# Patient Record
Sex: MALE | Race: WHITE
[De-identification: names, ages, dates, MRNs, and addresses within clinical notes are randomized per-mention and may not be internally consistent; named-entity substitution may affect disease eponyms.]

---

## 2019-01-03 ENCOUNTER — HOSPITAL ENCOUNTER (OUTPATIENT)
Dept: HOSPITAL 14 - H.ER | Age: 49
Setting detail: OBSERVATION
LOS: 1 days | Discharge: LEFT BEFORE DISCHARGE | End: 2019-01-04
Attending: INTERNAL MEDICINE | Admitting: INTERNAL MEDICINE
Payer: SELF-PAY

## 2019-01-03 DIAGNOSIS — Z88.6: ICD-10-CM

## 2019-01-03 DIAGNOSIS — Z86.711: ICD-10-CM

## 2019-01-03 DIAGNOSIS — Z79.01: ICD-10-CM

## 2019-01-03 DIAGNOSIS — I82.431: Primary | ICD-10-CM

## 2019-01-03 DIAGNOSIS — Z86.718: ICD-10-CM

## 2019-01-03 LAB
ALBUMIN SERPL-MCNC: 4.5 G/DL (ref 3.5–5)
ALBUMIN/GLOB SERPL: 1.1 {RATIO} (ref 1–2.1)
ALT SERPL-CCNC: 49 U/L (ref 21–72)
APTT BLD: 26.1 SECONDS (ref 25.6–37.1)
AST SERPL-CCNC: 36 U/L (ref 17–59)
BASE EXCESS BLDV CALC-SCNC: -2 MMOL/L (ref 0–2)
BASOPHILS # BLD AUTO: 0.1 K/UL (ref 0–0.2)
BASOPHILS NFR BLD: 0.9 % (ref 0–2)
BUN SERPL-MCNC: 15 MG/DL (ref 9–20)
CALCIUM SERPL-MCNC: 9.5 MG/DL (ref 8.4–10.2)
EOSINOPHIL # BLD AUTO: 0.1 K/UL (ref 0–0.7)
EOSINOPHIL NFR BLD: 2.4 % (ref 0–4)
ERYTHROCYTE [DISTWIDTH] IN BLOOD BY AUTOMATED COUNT: 19.1 % (ref 11.5–14.5)
GFR NON-AFRICAN AMERICAN: > 60
HGB BLD-MCNC: 12.5 G/DL (ref 12–18)
INR PPP: 1
LYMPHOCYTES # BLD AUTO: 2.1 K/UL (ref 1–4.3)
LYMPHOCYTES NFR BLD AUTO: 36.7 % (ref 20–40)
MCH RBC QN AUTO: 27.8 PG (ref 27–31)
MCHC RBC AUTO-ENTMCNC: 32.7 G/DL (ref 33–37)
MCV RBC AUTO: 85.1 FL (ref 80–94)
MONOCYTES # BLD: 0.5 K/UL (ref 0–0.8)
MONOCYTES NFR BLD: 8.4 % (ref 0–10)
NEUTROPHILS # BLD: 2.9 K/UL (ref 1.8–7)
NEUTROPHILS NFR BLD AUTO: 51.6 % (ref 50–75)
NRBC BLD AUTO-RTO: 0 % (ref 0–0)
PCO2 BLDV: 40 MMHG (ref 40–60)
PH BLDV: 7.37 [PH] (ref 7.32–7.43)
PLATELET # BLD: 233 K/UL (ref 130–400)
PMV BLD AUTO: 9 FL (ref 7.2–11.7)
PROTHROMBIN TIME: 11.1 SECONDS (ref 9.8–13.1)
RBC # BLD AUTO: 4.49 MIL/UL (ref 4.4–5.9)
VENOUS BLOOD FIO2: 21 %
VENOUS BLOOD GAS PO2: 49 MM/HG (ref 30–55)
WBC # BLD AUTO: 5.7 K/UL (ref 4.8–10.8)

## 2019-01-03 PROCEDURE — 85610 PROTHROMBIN TIME: CPT

## 2019-01-03 PROCEDURE — 99284 EMERGENCY DEPT VISIT MOD MDM: CPT

## 2019-01-03 PROCEDURE — 93005 ELECTROCARDIOGRAM TRACING: CPT

## 2019-01-03 PROCEDURE — 96374 THER/PROPH/DIAG INJ IV PUSH: CPT

## 2019-01-03 PROCEDURE — 85025 COMPLETE CBC W/AUTO DIFF WBC: CPT

## 2019-01-03 PROCEDURE — 82803 BLOOD GASES ANY COMBINATION: CPT

## 2019-01-03 PROCEDURE — 85730 THROMBOPLASTIN TIME PARTIAL: CPT

## 2019-01-03 PROCEDURE — 71045 X-RAY EXAM CHEST 1 VIEW: CPT

## 2019-01-03 PROCEDURE — 93971 EXTREMITY STUDY: CPT

## 2019-01-03 PROCEDURE — 80053 COMPREHEN METABOLIC PANEL: CPT

## 2019-01-03 PROCEDURE — 96372 THER/PROPH/DIAG INJ SC/IM: CPT

## 2019-01-03 NOTE — ED PDOC
Lower Extremity Pain/Injury


Time Seen by Provider: 01/03/19 21:25


Chief Complaint (Nursing): Lower Extremity Problem/Injury


Chief Complaint (Provider): right leg pain/swelling


History Per: Patient


History/Exam Limitations: no limitations


Onset/Duration Of Symptoms: Days (2)


Current Symptoms Are (Timing): Still Present


Additional Complaint(s): 





49 y/o male history of DVT/PE 2 years ago (stopped coumadin 1.5 years ago) 

presents for evaluation of right leg pain/swelling x 2 days.  Denies fever, 

chest pain, shortness of breath, palpitations, numbness/weakness lower ext

remities, known trauma, recent travel.  





Past Medical History


Reviewed: Historical Data, Nursing Documentation, Vital Signs


Vital Signs: 





                                Last Vital Signs











Temp  98.2 F   01/03/19 20:22


 


Pulse  89   01/03/19 20:22


 


Resp  16   01/03/19 20:22


 


BP  136/80   01/03/19 20:22


 


Pulse Ox  97   01/03/19 20:22














- Medical History


PMH: Deep Vein Thrombosis (left leg), Pulmonary Embolism





- Surgical History


Surgical History: No Surg Hx





- Family History


Family History: States: No Known Family Hx





- Social History


Current smoker - smoking cessation education provided: No





- Allergies


Allergies/Adverse Reactions: 


                                    Allergies











Allergy/AdvReac Type Severity Reaction Status Date / Time


 


No Known Allergies Allergy   Verified 01/03/19 20:22














Review of Systems


ROS Statement: Except As Marked, All Systems Reviewed And Found Negative


Musculoskeletal: Positive for: Leg Pain (right)





Physical Exam





- Reviewed


Nursing Documentation Reviewed: Yes


Vital Signs Reviewed: Yes





- Physical Exam


Appears: Positive for: Well, Non-toxic, Uncomfortable


Head Exam: Positive for: ATRAUMATIC, NORMAL INSPECTION, NORMOCEPHALIC


Skin: Positive for: Normal Color


ENT: Positive for: Normal ENT Inspection


Cardiovascular/Chest: Positive for: Regular Rate, Rhythm


Respiratory: Positive for: Normal Breath Sounds


Pulses-Dorsalis Pedis (L): 2+


Pulses-Dorsalis Pedis (R): 2+


Pulses-Post. Tibialis (L): 2+


Pulses-Post. Tibialis (R): 2+


Extremity: Positive for: Normal ROM, Calf Tenderness (right. + Sanam's sign ), 

Swelling (RLE; + abrasion anterior right shin with mild surroinding erythema)


Neurologic/Psych: Positive for: Alert, Oriented (x3).  Negative for: 

Motor/Sensory Deficits





- Laboratory Results


Result Diagrams: 


                                 01/03/19 22:27





                                 01/03/19 22:27





- ECG


ECG: Positive for: Viewed By Me (reviewed by ED attending)


ECG Rhythm: Positive for: Sinus Rhythm


O2 Sat by Pulse Oximetry: 97





- Radiology


X-Ray: Viewed By Me


X-Ray Interpretation: No Acute Disease





- Progress


ED Course And Treament: 





-cbc


-cmp


-pt/ptt


-vbg w/ lactate


-venous duplex RLE


-IV morphine





On re-eval, patient reports no improvement of pain; dilaudid dose ordered





Right lower extremity venous duplex. 


Indication: Right leg pain. 


Findings: 


Real-time ultrasound images with Doppler evaluation. 


Partially occlusive thrombus is noted in the right popliteal vein. Partial 

compressibility of the right popliteal vein. Unremarkable remaining deep veins 

of the right lower extremity. 


Impression: 


Partial deep venous thrombosis





Lovenox dose ordered


ekg, cxr ordered


Case discussed with Dr. Montana, Hospitalist on-call, for admission














Disposition





- Clinical Impression


Clinical Impression: 


 DVT (deep venous thrombosis)








- Patient ED Disposition


Is Patient to be Admitted: Yes





- Disposition


Disposition Time: 01:14


Condition: FAIR





- POA


Present On Arrival: Deep Vein Thrombosis / PE

## 2019-01-04 VITALS
RESPIRATION RATE: 20 BRPM | HEART RATE: 87 BPM | DIASTOLIC BLOOD PRESSURE: 84 MMHG | SYSTOLIC BLOOD PRESSURE: 137 MMHG | OXYGEN SATURATION: 96 % | TEMPERATURE: 97.6 F

## 2019-01-04 NOTE — US
Date of service: 



01/03/2019



PROCEDURE:  Right lower extremity venous duplex Doppler. 



HISTORY:

right leg pain/swelling







COMPARISON:

None available.



TECHNIQUE:

Common femoral, superficial femoral, popliteal and posterior tibial 

veins were evaluated. Flow was assessed with color Doppler, 

compressibility, assessment of phasic flow and augmentation response. 



FINDINGS:



COMMON FEMORAL VEIN:

Unremarkable.



SUPERFICIAL FEMORAL VEIN:

Unremarkable.



POPLITEAL VEIN:

Incomplete compression right popliteal vein, nonocclusive thrombus 

documented.



POSTERIOR TIBIAL VEIN:

Unremarkable.



OTHER FINDINGS:

None.



IMPRESSION:

Nonocclusive thrombus confined to a short segment of the right 

popliteal vein.  No evidence of proximal or more distal thrombus 

noted.



___________________________________________________



Concordant findings (preliminary report) provided by [facility].

## 2019-01-04 NOTE — RAD
Date of service: 



01/04/2019



HISTORY:

 admit 



COMPARISON:

No prior. 



FINDINGS:



LUNGS:

No definite infiltrate appreciated bilaterally.  Linear atelectasis 

or fibrosis in the inferior right lung zone and potentially at the 

medial left base.  Pulmonary volumes appear diminished overall.



PLEURA:

No significant pleural effusion identified, no pneumothorax apparent.



CARDIOVASCULAR:

No aortic atherosclerotic calcification present.



Cardiac silhouette appears prominent.  No pulmonary vascular 

congestion. 



OSSEOUS STRUCTURES:

No significant abnormalities.



VISUALIZED UPPER ABDOMEN:

Normal.



OTHER FINDINGS:

None.



IMPRESSION:

No acute infiltrate, pleural effusion or pulmonary vascular 

congestion.  Cardiac silhouette appears prominent.  Linear 

atelectasis or fibrosis seen at the bilateral bases.

## 2019-01-04 NOTE — CARD
--------------- APPROVED REPORT --------------





Date of service: 01/04/2019



EKG Measurement

Heart [id number]

[id number]

[id number]

[id number]

[id number]



<Conclusion>

Sinus rhythm with 1st degree AV block

Otherwise normal ECG

## 2019-01-04 NOTE — CP.PCM.HP
<Louis Anne - Last Filed: 01/04/19 02:13>





History of Present Illness





- History of Present Illness


History of Present Illness: 





47 yo M with pmhx of DVT/PE 2 yrs ago presents with R lower extremity swelling


Symptoms began 3 days prior. He noticed his leg below the calf, increase in 

swelling. Progressive pain with ambulation and then while at rest. 


Pt denies loss of sensation to toes or loss of function.


Of note: he experienced at DVT/PE 2 years prior and treated with heparin. He was

on coumadin therapy for 6 months after. 








PMD: none


Surg: none


Soc: denies smoking, alcohol, illicit drugs


Fam: DM and HTN. Denies hx of coagulopathy or bleeding disorders


Rx: none


No prior drug allergies. 





Present on Admission





- Present on Admission


Any Indicators Present on Admission: Yes


History of DVT/PE: Yes


History of Uncontrolled Diabetes: No


Urinary Catheter: No





Review of Systems





- Cardiovascular


Cardiovascular: absent: Chest Pain, Chest Pain at Rest





- Respiratory


Respiratory: absent: Cough, Dyspnea





- Gastrointestinal


Gastrointestinal: absent: Abdominal Pain





- Genitourinary


Genitourinary: absent: Change in Urinary Stream





- Musculoskeletal


Musculoskeletal: Abnormal Gait (R lower extremity swelling)





- Hematologic/Lymphatic


Hematologic: As Per HPI





Past Patient History





- Past Medical History & Family History


Past Medical History?: Yes





- Past Social History


Smoking Status: Never Smoked


Alcohol: None


Drugs: Denies


Home Situation {Lives}: With Family





- PULMONARY


Hx Respiratory Disorders: Yes


Hx Pulmonary Embolism: Yes





- PSYCHIATRIC


Hx Substance Use: No





- SURGICAL HISTORY


Hx Surgeries: No





- ANESTHESIA


Hx Anesthesia: No





Meds


Allergies/Adverse Reactions: 


                                    Allergies











Allergy/AdvReac Type Severity Reaction Status Date / Time


 


morphine Allergy  RASH Verified 01/04/19 02:12














Physical Exam





- Constitutional


Appears: No Acute Distress





- Eye Exam


Eye Exam: EOMI





- ENT Exam


ENT Exam: Mucous Membranes Moist





- Respiratory Exam


Respiratory Exam: Clear to Auscultation Bilateral, NORMAL BREATHING PATTERN.  

absent: Wheezes





- Cardiovascular Exam


Cardiovascular Exam: REGULAR RHYTHM, +S1, +S2





- GI/Abdominal Exam


GI & Abdominal Exam: Normal Bowel Sounds, Soft.  absent: Tenderness





- Extremities Exam


Extremities exam: Positive for: calf tenderness, tenderness





- Neurological Exam


Neurological exam: Alert, CN II-XII Intact, Oriented x3





- Psychiatric Exam


Psychiatric exam: Normal Affect, Normal Mood





Results





- Vital Signs


Recent Vital Signs: 





                                Last Vital Signs











Temp  98.2 F   01/04/19 01:55


 


Pulse  89   01/04/19 01:55


 


Resp  16   01/04/19 01:55


 


BP  136/80   01/04/19 01:55


 


Pulse Ox  97   01/04/19 01:46














- Labs


Result Diagrams: 


                                 01/03/19 22:27





                                 01/03/19 22:27


Labs: 





                         Laboratory Results - last 24 hr











  01/03/19 01/03/19 01/03/19





  22:22 22:27 22:27


 


WBC   5.7 


 


RBC   4.49 


 


Hgb   12.5 


 


Hct   38.2 


 


MCV   85.1 


 


MCH   27.8 


 


MCHC   32.7 L 


 


RDW   19.1 H 


 


Plt Count   233 


 


MPV   9.0 


 


Neut % (Auto)   51.6 


 


Lymph % (Auto)   36.7 


 


Mono % (Auto)   8.4 


 


Eos % (Auto)   2.4 


 


Baso % (Auto)   0.9 


 


Neut # (Auto)   2.9 


 


Lymph # (Auto)   2.1 


 


Mono # (Auto)   0.5 


 


Eos # (Auto)   0.1 


 


Baso # (Auto)   0.1 


 


PT   


 


INR   


 


APTT   


 


pO2  49  


 


VBG pH  7.37  


 


VBG pCO2  40  


 


VBG HCO3  23.0  


 


VBG Total CO2  24.3  


 


VBG O2 Sat (Calc)  88.0 H  


 


VBG Base Excess  -2.0 L  


 


VBG Potassium  4.0  


 


Sodium  137.0   141


 


Chloride  110.0 H   107


 


Glucose  126 H  


 


Lactate  1.5  


 


FiO2  21.0  


 


Potassium    4.1


 


Carbon Dioxide    23


 


Anion Gap    15


 


BUN    15


 


Creatinine    0.8


 


Est GFR ( Amer)    > 60


 


Est GFR (Non-Af Amer)    > 60


 


Random Glucose    121 H


 


Calcium    9.5


 


Total Bilirubin    0.2


 


AST    36


 


ALT    49


 


Alkaline Phosphatase    77


 


Total Protein    8.7 H


 


Albumin    4.5


 


Globulin    4.1 H


 


Albumin/Globulin Ratio    1.1


 


Venous Blood Potassium  4.0  














  01/03/19





  22:27


 


WBC 


 


RBC 


 


Hgb 


 


Hct 


 


MCV 


 


MCH 


 


MCHC 


 


RDW 


 


Plt Count 


 


MPV 


 


Neut % (Auto) 


 


Lymph % (Auto) 


 


Mono % (Auto) 


 


Eos % (Auto) 


 


Baso % (Auto) 


 


Neut # (Auto) 


 


Lymph # (Auto) 


 


Mono # (Auto) 


 


Eos # (Auto) 


 


Baso # (Auto) 


 


PT  11.1


 


INR  1.0


 


APTT  26.1


 


pO2 


 


VBG pH 


 


VBG pCO2 


 


VBG HCO3 


 


VBG Total CO2 


 


VBG O2 Sat (Calc) 


 


VBG Base Excess 


 


VBG Potassium 


 


Sodium 


 


Chloride 


 


Glucose 


 


Lactate 


 


FiO2 


 


Potassium 


 


Carbon Dioxide 


 


Anion Gap 


 


BUN 


 


Creatinine 


 


Est GFR ( Amer) 


 


Est GFR (Non-Af Amer) 


 


Random Glucose 


 


Calcium 


 


Total Bilirubin 


 


AST 


 


ALT 


 


Alkaline Phosphatase 


 


Total Protein 


 


Albumin 


 


Globulin 


 


Albumin/Globulin Ratio 


 


Venous Blood Potassium 














Assessment & Plan





- Assessment and Plan (Free Text)


Assessment: 





47 yo M with pmhx of DVT/PE 2 yrs ago presents with R lower extremity swelling; 

admitted for DVT


Plan: 





DVT


Admit to [facility]


Venous duplex: partial DVT


S/P lovenox 110 mg SC 


c/w lovenox 40 mg SC


monitor for SOB signs for PE


monitor vitals


Pain management


c/w neurochecks R lower extremity





Case dw Dr. Rubén Anne MD PGY2








<Thalia Montana - Last Filed: 01/04/19 07:05>





Results





- Vital Signs


Recent Vital Signs: 





                                Last Vital Signs











Temp  97.6 F   01/04/19 02:33


 


Pulse  87   01/04/19 03:34


 


Resp  20   01/04/19 03:34


 


BP  137/84   01/04/19 02:33


 


Pulse Ox  96   01/04/19 03:34














- Labs


Result Diagrams: 


                                 01/03/19 22:27





                                 01/03/19 22:27


Labs: 





                         Laboratory Results - last 24 hr











  01/03/19 01/03/19 01/03/19





  22:22 22:27 22:27


 


WBC   5.7 


 


RBC   4.49 


 


Hgb   12.5 


 


Hct   38.2 


 


MCV   85.1 


 


MCH   27.8 


 


MCHC   32.7 L 


 


RDW   19.1 H 


 


Plt Count   233 


 


MPV   9.0 


 


Neut % (Auto)   51.6 


 


Lymph % (Auto)   36.7 


 


Mono % (Auto)   8.4 


 


Eos % (Auto)   2.4 


 


Baso % (Auto)   0.9 


 


Neut # (Auto)   2.9 


 


Lymph # (Auto)   2.1 


 


Mono # (Auto)   0.5 


 


Eos # (Auto)   0.1 


 


Baso # (Auto)   0.1 


 


PT   


 


INR   


 


APTT   


 


pO2  49  


 


VBG pH  7.37  


 


VBG pCO2  40  


 


VBG HCO3  23.0  


 


VBG Total CO2  24.3  


 


VBG O2 Sat (Calc)  88.0 H  


 


VBG Base Excess  -2.0 L  


 


VBG Potassium  4.0  


 


Sodium  137.0   141


 


Chloride  110.0 H   107


 


Glucose  126 H  


 


Lactate  1.5  


 


FiO2  21.0  


 


Potassium    4.1


 


Carbon Dioxide    23


 


Anion Gap    15


 


BUN    15


 


Creatinine    0.8


 


Est GFR ( Amer)    > 60


 


Est GFR (Non-Af Amer)    > 60


 


Random Glucose    121 H


 


Calcium    9.5


 


Total Bilirubin    0.2


 


AST    36


 


ALT    49


 


Alkaline Phosphatase    77


 


Total Protein    8.7 H


 


Albumin    4.5


 


Globulin    4.1 H


 


Albumin/Globulin Ratio    1.1


 


Venous Blood Potassium  4.0  














  01/03/19





  22:27


 


WBC 


 


RBC 


 


Hgb 


 


Hct 


 


MCV 


 


MCH 


 


MCHC 


 


RDW 


 


Plt Count 


 


MPV 


 


Neut % (Auto) 


 


Lymph % (Auto) 


 


Mono % (Auto) 


 


Eos % (Auto) 


 


Baso % (Auto) 


 


Neut # (Auto) 


 


Lymph # (Auto) 


 


Mono # (Auto) 


 


Eos # (Auto) 


 


Baso # (Auto) 


 


PT  11.1


 


INR  1.0


 


APTT  26.1


 


pO2 


 


VBG pH 


 


VBG pCO2 


 


VBG HCO3 


 


VBG Total CO2 


 


VBG O2 Sat (Calc) 


 


VBG Base Excess 


 


VBG Potassium 


 


Sodium 


 


Chloride 


 


Glucose 


 


Lactate 


 


FiO2 


 


Potassium 


 


Carbon Dioxide 


 


Anion Gap 


 


BUN 


 


Creatinine 


 


Est GFR ( Amer) 


 


Est GFR (Non-Af Amer) 


 


Random Glucose 


 


Calcium 


 


Total Bilirubin 


 


AST 


 


ALT 


 


Alkaline Phosphatase 


 


Total Protein 


 


Albumin 


 


Globulin 


 


Albumin/Globulin Ratio 


 


Venous Blood Potassium 














Attending/Attestation





- Attestation


I have personally seen and examined this patient.: Yes


I have fully participated in the care of the patient.: Yes


I have reviewed all pertinent clinical information: Yes


Notes (Text): 





01/04/19 07:03


48 year old male hx of DVT prior on Coumadin for 6 months, back to ED for lower 

extr swelling and pain. Partial DVT RLE on US. Pt initiated on lovenox. R

equesting Dilaudid and when he did not receive it, patient signed out AMA. This 

is to also serve as THE DISCHARGE SUMMARY.





Note: patient left, refused to sign AMA papers.